# Patient Record
Sex: MALE | Race: OTHER | HISPANIC OR LATINO | URBAN - METROPOLITAN AREA
[De-identification: names, ages, dates, MRNs, and addresses within clinical notes are randomized per-mention and may not be internally consistent; named-entity substitution may affect disease eponyms.]

---
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
## 2019-01-01 NOTE — ED PROVIDER NOTE - OBJECTIVE STATEMENT
18 y/o M presents to ED BIBA c/o carbon monoxide exposure. Pt was at work, and noticed that it was getting warm inside the building. A few moment later, the carbon monoxide detector and he went outside as per directions from his manager. Pt was asymptomatic PTA and currently has no symptoms or complaints in the ED. No further complaints at this time.    According to the , the reading inside the building was 16 y/o M presents to ED BIBA c/o carbon monoxide exposure. Pt was at work, and noticed that it was getting warm inside the building. A few moment later, the carbon monoxide detector and he went outside as per directions from his manager. Pt was asymptomatic PTA and currently has no symptoms or complaints in the ED. No further complaints at this time.    According to the , the reading inside the building was 185. Statement Selected